# Patient Record
Sex: MALE | Race: WHITE | NOT HISPANIC OR LATINO | Employment: FULL TIME | URBAN - METROPOLITAN AREA
[De-identification: names, ages, dates, MRNs, and addresses within clinical notes are randomized per-mention and may not be internally consistent; named-entity substitution may affect disease eponyms.]

---

## 2021-05-17 ENCOUNTER — OFFICE VISIT (OUTPATIENT)
Dept: FAMILY MEDICINE CLINIC | Facility: CLINIC | Age: 56
End: 2021-05-17

## 2021-05-17 VITALS
DIASTOLIC BLOOD PRESSURE: 90 MMHG | TEMPERATURE: 97.5 F | HEIGHT: 67 IN | BODY MASS INDEX: 28.09 KG/M2 | WEIGHT: 179 LBS | OXYGEN SATURATION: 96 % | HEART RATE: 71 BPM | SYSTOLIC BLOOD PRESSURE: 124 MMHG

## 2021-05-17 DIAGNOSIS — N40.0 ENLARGED PROSTATE: Primary | ICD-10-CM

## 2021-05-17 DIAGNOSIS — D18.03 LIVER HEMANGIOMA: ICD-10-CM

## 2021-05-17 DIAGNOSIS — Z12.5 SCREENING PSA (PROSTATE SPECIFIC ANTIGEN): ICD-10-CM

## 2021-05-17 DIAGNOSIS — R31.21 ASYMPTOMATIC MICROSCOPIC HEMATURIA: ICD-10-CM

## 2021-05-17 DIAGNOSIS — Z00.00 WELLNESS EXAMINATION: ICD-10-CM

## 2021-05-17 DIAGNOSIS — R93.5 ABNORMAL CT OF THE ABDOMEN: ICD-10-CM

## 2021-05-17 PROBLEM — Z12.11 SCREEN FOR COLON CANCER: Status: ACTIVE | Noted: 2019-10-31

## 2021-05-17 PROBLEM — K52.9 CHRONIC DIARRHEA: Status: ACTIVE | Noted: 2019-10-29

## 2021-05-17 PROBLEM — R97.20 ELEVATED PSA, LESS THAN 10 NG/ML: Status: ACTIVE | Noted: 2019-10-31

## 2021-05-17 LAB
BILIRUB BLD-MCNC: NEGATIVE MG/DL
CLARITY, POC: CLEAR
COLOR UR: YELLOW
GLUCOSE UR STRIP-MCNC: NEGATIVE MG/DL
KETONES UR QL: NEGATIVE
LEUKOCYTE EST, POC: NEGATIVE
NITRITE UR-MCNC: NEGATIVE MG/ML
PH UR: 6 [PH] (ref 5–8)
PROT UR STRIP-MCNC: NEGATIVE MG/DL
RBC # UR STRIP: ABNORMAL /UL
SP GR UR: 1.01 (ref 1–1.03)
UROBILINOGEN UR QL: NORMAL

## 2021-05-17 PROCEDURE — 99203 OFFICE O/P NEW LOW 30 MIN: CPT | Performed by: FAMILY MEDICINE

## 2021-05-17 PROCEDURE — 81003 URINALYSIS AUTO W/O SCOPE: CPT | Performed by: FAMILY MEDICINE

## 2021-05-17 RX ORDER — TAMSULOSIN HYDROCHLORIDE 0.4 MG/1
CAPSULE ORAL
COMMUNITY
Start: 2021-04-29 | End: 2021-06-18

## 2021-05-17 NOTE — PROGRESS NOTES
Subjective   Jhonatan Stewart is a 55 y.o. male.     Chief Complaint   Patient presents with   • Establish Care   • Follow-up     hospital        History of Present Illness     Patient was admitted to [facility]  ALL records were obtained and reviewed and /or discussed with admitting physician  Date of admission 5/10/21  Date of discharge 5/10/21  Diagnosis Retained urine  Medications upon discharge flomax   Disposition fine  Follow up today  Currently c/o  With catheter   Condition stable  Problems urinating and had a Catheter seen by Urologist [facility], Colton Kennedy. Has Enlarged Prostate, and abnormal CT of the abdomen, recommended bone scan, patient does not smoke, there is no family history of prostate cancer,, patient just received his first Covid vaccination  The following portions of the patient's history were reviewed and updated as appropriate: allergies, current medications, past family history, past medical history, past social history, past surgical history and problem list.    History reviewed. No pertinent past medical history.    History reviewed. No pertinent surgical history.    Family History   Problem Relation Age of Onset   • Stroke Mother    • Cancer Mother    • Hyperlipidemia Mother    • Diabetes Father    • Heart disease Father    • Hyperlipidemia Father        Social History     Socioeconomic History   • Marital status: Other     Spouse name: Not on file   • Number of children: Not on file   • Years of education: Not on file   • Highest education level: Not on file   Tobacco Use   • Smoking status: Never Smoker   • Smokeless tobacco: Never Used   Substance and Sexual Activity   • Alcohol use: Yes     Comment: socially    • Drug use: Never   • Sexual activity: Defer       Review of Systems   Constitutional: Negative.    HENT: Negative.    Eyes: Negative.    Respiratory: Negative.    Cardiovascular: Negative.    Gastrointestinal: Negative.    Endocrine: Negative.    Genitourinary: Negative.     Musculoskeletal: Negative.    Skin: Negative.    Allergic/Immunologic: Negative.    Neurological: Negative.    Hematological: Negative.    Psychiatric/Behavioral: Negative.    All other systems reviewed and are negative.      Objective   Vitals:    05/17/21 1027   BP: 124/90   Pulse: 71   Temp: 97.5 °F (36.4 °C)   SpO2: 96%     Body mass index is 28.04 kg/m².  Physical Exam  Vitals and nursing note reviewed.   Constitutional:       Appearance: He is well-developed.   HENT:      Head: Normocephalic and atraumatic.      Right Ear: External ear normal.      Left Ear: External ear normal.      Nose: Nose normal.   Eyes:      General: No scleral icterus.        Right eye: No discharge.         Left eye: No discharge.      Conjunctiva/sclera: Conjunctivae normal.      Pupils: Pupils are equal, round, and reactive to light.   Neck:      Thyroid: No thyromegaly.      Vascular: No JVD.      Trachea: No tracheal deviation.   Cardiovascular:      Rate and Rhythm: Normal rate and regular rhythm.      Heart sounds: Normal heart sounds. No murmur heard.   No friction rub. No gallop.    Pulmonary:      Effort: Pulmonary effort is normal. No respiratory distress.      Breath sounds: Normal breath sounds. No wheezing or rales.   Chest:      Chest wall: No tenderness.   Abdominal:      General: Bowel sounds are normal. There is no distension.      Palpations: Abdomen is soft. There is no mass.      Tenderness: There is no abdominal tenderness. There is no guarding or rebound.      Hernia: No hernia is present.   Genitourinary:     Comments: He does have a catheter  Musculoskeletal:         General: No tenderness. Normal range of motion.      Cervical back: Normal range of motion and neck supple.   Lymphadenopathy:      Cervical: No cervical adenopathy.   Skin:     General: Skin is warm and dry.   Neurological:      Cranial Nerves: No cranial nerve deficit.      Sensory: No sensory deficit.      Motor: No abnormal muscle tone.       Coordination: Coordination normal.      Deep Tendon Reflexes: Reflexes normal.   Psychiatric:         Behavior: Behavior normal.         Thought Content: Thought content normal.         Judgment: Judgment normal.           Assessment/Plan   Diagnoses and all orders for this visit:    1. Enlarged prostate (Primary)  -     PSA Screen    2. Wellness examination  -     Cancel: CBC & Differential  -     Comprehensive Metabolic Panel  -     Lipid Panel  -     TSH  -     POC Urinalysis Dipstick, Automated    3. Screening PSA (prostate specific antigen)  -     PSA Screen    4. Liver hemangioma  -     Ambulatory Referral to Gastroenterology    5. Abnormal CT of the abdomen  -     NM Bone Scan Whole Body; Future

## 2021-05-18 DIAGNOSIS — R79.89 ELEVATED TSH: Primary | ICD-10-CM

## 2021-05-18 LAB
ALBUMIN SERPL-MCNC: 4.7 G/DL (ref 3.5–5.2)
ALBUMIN/GLOB SERPL: 2.4 G/DL
ALP SERPL-CCNC: 86 U/L (ref 39–117)
ALT SERPL-CCNC: 28 U/L (ref 1–41)
AST SERPL-CCNC: 25 U/L (ref 1–40)
BILIRUB SERPL-MCNC: 0.7 MG/DL (ref 0–1.2)
BUN SERPL-MCNC: 13 MG/DL (ref 6–20)
BUN/CREAT SERPL: 14 (ref 7–25)
CALCIUM SERPL-MCNC: 10 MG/DL (ref 8.6–10.5)
CHLORIDE SERPL-SCNC: 104 MMOL/L (ref 98–107)
CHOLEST SERPL-MCNC: 148 MG/DL (ref 0–200)
CO2 SERPL-SCNC: 28.8 MMOL/L (ref 22–29)
CREAT SERPL-MCNC: 0.93 MG/DL (ref 0.76–1.27)
GLOBULIN SER CALC-MCNC: 2 GM/DL
GLUCOSE SERPL-MCNC: 96 MG/DL (ref 65–99)
HDLC SERPL-MCNC: 61 MG/DL (ref 40–60)
LDLC SERPL CALC-MCNC: 75 MG/DL (ref 0–100)
POTASSIUM SERPL-SCNC: 4.4 MMOL/L (ref 3.5–5.2)
PROT SERPL-MCNC: 6.7 G/DL (ref 6–8.5)
PSA SERPL-MCNC: 6.85 NG/ML (ref 0–4)
SODIUM SERPL-SCNC: 140 MMOL/L (ref 136–145)
TRIGL SERPL-MCNC: 55 MG/DL (ref 0–150)
TSH SERPL DL<=0.005 MIU/L-ACNC: 4.21 UIU/ML (ref 0.27–4.2)
VLDLC SERPL CALC-MCNC: 12 MG/DL (ref 5–40)

## 2021-05-20 ENCOUNTER — TELEPHONE (OUTPATIENT)
Dept: FAMILY MEDICINE CLINIC | Facility: CLINIC | Age: 56
End: 2021-05-20

## 2021-05-20 DIAGNOSIS — Z12.5 SCREENING PSA (PROSTATE SPECIFIC ANTIGEN): ICD-10-CM

## 2021-05-20 DIAGNOSIS — R31.21 ASYMPTOMATIC MICROSCOPIC HEMATURIA: ICD-10-CM

## 2021-05-20 DIAGNOSIS — N40.0 ENLARGED PROSTATE: Primary | ICD-10-CM

## 2021-05-20 DIAGNOSIS — R97.20 ELEVATED PSA: ICD-10-CM

## 2021-05-20 DIAGNOSIS — R31.21 ASYMPTOMATIC MICROSCOPIC HEMATURIA: Primary | ICD-10-CM

## 2021-05-20 NOTE — TELEPHONE ENCOUNTER
I contacted patient and his girlfriend, they want a different urologist because the urologist there seen if no return or call, I will put a new referral

## 2021-05-20 NOTE — TELEPHONE ENCOUNTER
PATIENTS WIFE WANTS TO KNOW IF PATIENT NEEDS TO BE ON ANTIBIOTIC BECAUSE OF THE BLOOD THAT WAS IN HIS URINE.    PLEASE RECOMMEND A UROLOGIST.    [id number] - [place], [place] - [address] [facility] [address] AT [facility] - [phone] [facility] [phone]   [phone]    PLEASE ADVISE: [phone]

## 2021-05-21 ENCOUNTER — TELEPHONE (OUTPATIENT)
Dept: GASTROENTEROLOGY | Facility: CLINIC | Age: 56
End: 2021-05-21

## 2021-05-21 ENCOUNTER — TELEPHONE (OUTPATIENT)
Dept: FAMILY MEDICINE CLINIC | Facility: CLINIC | Age: 56
End: 2021-05-21

## 2021-05-21 LAB
BACTERIA UR CULT: ABNORMAL
OTHER ANTIBIOTIC SUSC ISLT: ABNORMAL

## 2021-05-21 NOTE — TELEPHONE ENCOUNTER
Caller: ROBER MARTIN    Relationship to patient: Emergency Contact    Best call back number: [phone]    Patient is needing:     PATIENT'S WIFE CALLED INFORMING DR RANGEL THAT PATIENT IS SCHEDULED FOR HIS TURP SURGERY, ON HIS PROSTATE, WITH DR BARRERA FOR THIS Monday 05/24/21.

## 2021-05-22 DIAGNOSIS — N39.0 URINARY TRACT INFECTION ASSOCIATED WITH INDWELLING URETHRAL CATHETER, SEQUELA: Primary | ICD-10-CM

## 2021-05-22 DIAGNOSIS — T83.511S URINARY TRACT INFECTION ASSOCIATED WITH INDWELLING URETHRAL CATHETER, SEQUELA: Primary | ICD-10-CM

## 2021-05-22 RX ORDER — SULFAMETHOXAZOLE AND TRIMETHOPRIM 800; 160 MG/1; MG/1
1 TABLET ORAL 2 TIMES DAILY
Qty: 14 TABLET | Refills: 0 | Status: SHIPPED | OUTPATIENT
Start: 2021-05-22 | End: 2021-06-18

## 2021-05-28 ENCOUNTER — HOSPITAL ENCOUNTER (OUTPATIENT)
Dept: NUCLEAR MEDICINE | Facility: HOSPITAL | Age: 56
Discharge: HOME | End: 2021-05-28

## 2021-05-28 DIAGNOSIS — R93.5 ABNORMAL CT OF THE ABDOMEN: ICD-10-CM

## 2021-05-28 PROCEDURE — A9503 TC99M MEDRONATE: HCPCS | Performed by: FAMILY MEDICINE

## 2021-05-28 PROCEDURE — 78306 BONE IMAGING WHOLE BODY: CPT

## 2021-05-28 PROCEDURE — 0 TECHNETIUM MEDRONATE KIT: Performed by: FAMILY MEDICINE

## 2021-05-28 RX ORDER — TC 99M MEDRONATE 20 MG/10ML
19 INJECTION, POWDER, LYOPHILIZED, FOR SOLUTION INTRAVENOUS
Status: COMPLETED | OUTPATIENT
Start: 2021-05-28 | End: 2021-05-28

## 2021-05-28 RX ADMIN — Medication 19 MILLICURIE: at 11:20

## 2021-06-03 ENCOUNTER — TELEPHONE (OUTPATIENT)
Dept: FAMILY MEDICINE CLINIC | Facility: CLINIC | Age: 56
End: 2021-06-03

## 2021-06-03 NOTE — TELEPHONE ENCOUNTER
----- Message from Ronal Chairez MD sent at 5/31/2021 10:37 AM EDT -----  Please call the patient regarding his abnormal result. Bone scan only shows mild intake Left hip due to arthritis, otherwise normal

## 2021-06-18 ENCOUNTER — OFFICE VISIT (OUTPATIENT)
Dept: FAMILY MEDICINE CLINIC | Facility: CLINIC | Age: 56
End: 2021-06-18

## 2021-06-18 VITALS
HEART RATE: 60 BPM | WEIGHT: 178.8 LBS | BODY MASS INDEX: 28.06 KG/M2 | TEMPERATURE: 97.3 F | HEIGHT: 67 IN | OXYGEN SATURATION: 95 % | SYSTOLIC BLOOD PRESSURE: 130 MMHG | DIASTOLIC BLOOD PRESSURE: 87 MMHG

## 2021-06-18 DIAGNOSIS — R82.90 URINE ABNORMALITY: ICD-10-CM

## 2021-06-18 DIAGNOSIS — Z00.00 WELLNESS EXAMINATION: ICD-10-CM

## 2021-06-18 DIAGNOSIS — R79.89 ELEVATED TSH: Primary | ICD-10-CM

## 2021-06-18 PROBLEM — N13.8 BPH WITH URINARY OBSTRUCTION: Status: ACTIVE | Noted: 2021-05-24

## 2021-06-18 PROBLEM — N40.1 BPH WITH URINARY OBSTRUCTION: Status: ACTIVE | Noted: 2021-05-24

## 2021-06-18 LAB
BILIRUB BLD-MCNC: NEGATIVE MG/DL
CLARITY, POC: CLEAR
COLOR UR: YELLOW
GLUCOSE UR STRIP-MCNC: NEGATIVE MG/DL
KETONES UR QL: NEGATIVE
LEUKOCYTE EST, POC: ABNORMAL
NITRITE UR-MCNC: NEGATIVE MG/ML
PH UR: 6 [PH] (ref 5–8)
PROT UR STRIP-MCNC: NEGATIVE MG/DL
RBC # UR STRIP: ABNORMAL /UL
SP GR UR: 1.01 (ref 1–1.03)
UROBILINOGEN UR QL: NORMAL

## 2021-06-18 PROCEDURE — 81003 URINALYSIS AUTO W/O SCOPE: CPT | Performed by: FAMILY MEDICINE

## 2021-06-18 PROCEDURE — 99213 OFFICE O/P EST LOW 20 MIN: CPT | Performed by: FAMILY MEDICINE

## 2021-06-18 RX ORDER — BACITRACIN 500 UNIT/G
OINTMENT (GRAM) TOPICAL
COMMUNITY
End: 2022-07-06

## 2021-06-18 RX ORDER — CHLORAL HYDRATE 500 MG
CAPSULE ORAL
COMMUNITY

## 2021-06-18 RX ORDER — DIAPER,BRIEF,ADULT, DISPOSABLE
EACH MISCELLANEOUS
COMMUNITY

## 2021-06-18 NOTE — PROGRESS NOTES
"Chief Complaint  Follow-up    Subjective          Jhonatan Stewart presents to [facility]  History of Present Illness  Had TURP Sx all good, surgery was last week, patient is voiding good, no chest pain or shortness of breath, he already saw the urologist, today is being seen for elevated TSH, no family history of hypothyroidism, patient feels good, no dysuria, wants Tetanus Ab  Objective   Vital Signs:   /87 (BP Location: Left arm, Patient Position: Sitting)   Pulse 60   Temp 97.3 °F (36.3 °C) (Temporal)   Ht 170.2 cm (67.01\")   Wt 81.1 kg (178 lb 12.8 oz)   SpO2 95%   BMI 28.00 kg/m²     Physical Exam  Vitals and nursing note reviewed.   Constitutional:       Appearance: He is well-developed.   HENT:      Head: Normocephalic and atraumatic.      Right Ear: External ear normal.      Left Ear: External ear normal.      Nose: Nose normal.   Eyes:      General: No scleral icterus.        Right eye: No discharge.         Left eye: No discharge.      Conjunctiva/sclera: Conjunctivae normal.      Pupils: Pupils are equal, round, and reactive to light.   Neck:      Thyroid: No thyromegaly.      Vascular: No JVD.      Trachea: No tracheal deviation.   Cardiovascular:      Rate and Rhythm: Normal rate and regular rhythm.      Heart sounds: Normal heart sounds. No murmur heard.   No friction rub. No gallop.    Pulmonary:      Effort: Pulmonary effort is normal. No respiratory distress.      Breath sounds: Normal breath sounds. No wheezing or rales.   Chest:      Chest wall: No tenderness.   Abdominal:      General: Bowel sounds are normal. There is no distension.      Palpations: Abdomen is soft. There is no mass.      Tenderness: There is no abdominal tenderness. There is no guarding or rebound.      Hernia: No hernia is present.   Musculoskeletal:         General: No tenderness. Normal range of motion.      Cervical back: Normal range of motion and neck supple. No rigidity or " tenderness.   Lymphadenopathy:      Cervical: No cervical adenopathy.   Skin:     General: Skin is warm and dry.   Neurological:      General: No focal deficit present.      Mental Status: He is alert.      Cranial Nerves: No cranial nerve deficit.      Sensory: No sensory deficit.      Motor: No abnormal muscle tone.      Coordination: Coordination normal.      Deep Tendon Reflexes: Reflexes normal.   Psychiatric:         Mood and Affect: Mood normal.         Behavior: Behavior normal.         Thought Content: Thought content normal.         Judgment: Judgment normal.        Result Review :                 Assessment and Plan    Diagnoses and all orders for this visit:    1. Elevated TSH (Primary)  -     Cancel: TSH  -     Cancel: T3, Free  -     Cancel: T4, Free  -     Tetanus Toxoid, IgG  -     TSH  -     T3, Free  -     T4, Free    2. Urine abnormality  -     POC Urinalysis Dipstick, Automated  -     Urine Culture - Urine, Urine, Clean Catch    3. Wellness examination  -     Tetanus Toxoid, IgG  -     TSH  -     T3, Free  -     T4, Free        Follow Up   Return in about 1 year (around 6/18/2022).  Patient was given instructions and counseling regarding his condition or for health maintenance advice. Please see specific information pulled into the AVS if appropriate.

## 2021-06-20 LAB
BACTERIA UR CULT: NO GROWTH
BACTERIA UR CULT: NORMAL

## 2021-06-22 LAB
C TETANI TOXOID IGG SERPL IA-ACNC: 1.61 IU/ML
T3FREE SERPL-MCNC: 3.4 PG/ML (ref 2–4.4)
T4 FREE SERPL-MCNC: 1.14 NG/DL (ref 0.93–1.7)
TSH SERPL DL<=0.005 MIU/L-ACNC: 3.13 UIU/ML (ref 0.27–4.2)

## 2021-06-30 ENCOUNTER — OFFICE VISIT (OUTPATIENT)
Dept: GASTROENTEROLOGY | Facility: CLINIC | Age: 56
End: 2021-06-30

## 2021-06-30 VITALS — TEMPERATURE: 97.5 F | WEIGHT: 179.6 LBS | BODY MASS INDEX: 28.19 KG/M2 | HEIGHT: 67 IN

## 2021-06-30 DIAGNOSIS — R93.3 ABNORMAL CT SCAN, ESOPHAGUS: Primary | ICD-10-CM

## 2021-06-30 DIAGNOSIS — K44.9 HIATAL HERNIA: ICD-10-CM

## 2021-06-30 DIAGNOSIS — D18.03 HEPATIC HEMANGIOMA: ICD-10-CM

## 2021-06-30 PROCEDURE — 99204 OFFICE O/P NEW MOD 45 MIN: CPT | Performed by: INTERNAL MEDICINE

## 2021-06-30 NOTE — PROGRESS NOTES
Chief Complaint   Patient presents with   • liver hemangioma     Subjective   HPI  Jhonatan Stewart is a 55 y.o. male who presents today for new patient evaluation.  Referred for evaluation of abnormal CT scan of the abdomen and pelvis.  This was performed due to some prostate issues.  There was incidental note of a low density lesion in the right hepatic lobe suggestive of hepatic hemangioma.  Lesion  measured about 2 cm in diameter.  There is also again probably incidental note of a small hiatal hernia with some potential mild thickening of the distal esophagus.  The patient has no gastrointestinal complaints.  He has no dysphagia or heartburn.  He is never had prior upper endoscopy.  He did have a normal colonoscopy 2 years ago.  He has no family history of liver disorders.      IMPRESSION:   1. Enlarged prostate gland with heterogeneous appearance. Please correlate clinically including PSA. Tovar catheter in place within the urinary bladder.   2. Low-density lesion right hepatic lobe with probable and. Characteristic of hepatic hemangioma.   3. Scattered sclerotic foci at the bony pelvis and proximal femurs nonspecific. If no other prior images are available for comparison. Follow-up bone scan is recommended.      Objective   Vitals:    06/30/21 0845   Temp: 97.5 °F (36.4 °C)     Physical Exam  Vitals reviewed.   Constitutional:       Appearance: He is well-developed.   HENT:      Head: Normocephalic and atraumatic.   Abdominal:      General: Bowel sounds are normal. There is no distension.      Palpations: Abdomen is soft. There is no mass.      Tenderness: There is no abdominal tenderness.      Hernia: No hernia is present.   Skin:     General: Skin is warm and dry.   Neurological:      Mental Status: He is alert and oriented to person, place, and time.   Psychiatric:         Behavior: Behavior normal.         Thought Content: Thought content normal.         Judgment: Judgment normal.              Assessment/Plan    Assessment:     1. Abnormal CT scan, esophagus    2. Hiatal hernia    3. Hepatic hemangioma      Plan:   Will arrange for EGD to evaluate the possible distal esophageal thickening seen on CT but this would seem to be an incidental finding as the patient is completely asymptomatic.  He is up-to-date on colonoscopy.  His CT scan also suggest a benign hepatic hemangioma I will arrange for him to have an MRI with and without contrast for further characterization if we are convinced this is truly a hemangioma then no further work-up would be necessary from this regard            Richard Mariano M.D.  [facility]  [address] [place] [address]  Office: [phone]

## 2021-07-15 ENCOUNTER — TELEPHONE (OUTPATIENT)
Dept: GASTROENTEROLOGY | Facility: CLINIC | Age: 56
End: 2021-07-15

## 2021-07-15 PROBLEM — R93.3 ABNORMAL CT SCAN, ESOPHAGUS: Status: ACTIVE | Noted: 2021-07-15

## 2021-07-29 ENCOUNTER — APPOINTMENT (OUTPATIENT)
Dept: MRI IMAGING | Facility: HOSPITAL | Age: 56
End: 2021-07-29

## 2021-08-01 ENCOUNTER — HOSPITAL ENCOUNTER (OUTPATIENT)
Dept: MRI IMAGING | Facility: HOSPITAL | Age: 56
Discharge: HOME | End: 2021-08-01

## 2021-08-01 DIAGNOSIS — D18.03 HEPATIC HEMANGIOMA: ICD-10-CM

## 2021-08-12 ENCOUNTER — TRANSCRIBE ORDERS (OUTPATIENT)
Dept: SLEEP MEDICINE | Facility: HOSPITAL | Age: 56
End: 2021-08-12

## 2021-08-12 DIAGNOSIS — Z01.818 OTHER SPECIFIED PRE-OPERATIVE EXAMINATION: Primary | ICD-10-CM

## 2022-07-06 ENCOUNTER — OFFICE VISIT (OUTPATIENT)
Dept: FAMILY MEDICINE CLINIC | Facility: CLINIC | Age: 57
End: 2022-07-06

## 2022-07-06 VITALS
DIASTOLIC BLOOD PRESSURE: 77 MMHG | SYSTOLIC BLOOD PRESSURE: 122 MMHG | HEART RATE: 51 BPM | HEIGHT: 67 IN | TEMPERATURE: 97.8 F | OXYGEN SATURATION: 95 % | WEIGHT: 175.4 LBS | RESPIRATION RATE: 15 BRPM | BODY MASS INDEX: 27.53 KG/M2

## 2022-07-06 DIAGNOSIS — R29.898 ELBOW PROBLEM: ICD-10-CM

## 2022-07-06 DIAGNOSIS — R97.20 ELEVATED PSA: ICD-10-CM

## 2022-07-06 DIAGNOSIS — R79.89 ELEVATED TSH: ICD-10-CM

## 2022-07-06 DIAGNOSIS — Z12.5 SCREENING PSA (PROSTATE SPECIFIC ANTIGEN): ICD-10-CM

## 2022-07-06 DIAGNOSIS — M25.50 ARTHRALGIA, UNSPECIFIED JOINT: ICD-10-CM

## 2022-07-06 DIAGNOSIS — Z00.00 WELLNESS EXAMINATION: Primary | ICD-10-CM

## 2022-07-06 LAB
BILIRUB BLD-MCNC: NEGATIVE MG/DL
CLARITY, POC: CLEAR
COLOR UR: YELLOW
EXPIRATION DATE: ABNORMAL
GLUCOSE UR STRIP-MCNC: NEGATIVE MG/DL
KETONES UR QL: NEGATIVE
LEUKOCYTE EST, POC: NEGATIVE
Lab: ABNORMAL
NITRITE UR-MCNC: NEGATIVE MG/ML
PH UR: 6 [PH] (ref 5–8)
PROT UR STRIP-MCNC: ABNORMAL MG/DL
RBC # UR STRIP: NEGATIVE /UL
SP GR UR: 1.02 (ref 1–1.03)
UROBILINOGEN UR QL: NORMAL

## 2022-07-06 PROCEDURE — 91305 COVID-19 (PFIZER) 12+ YRS: CPT | Performed by: FAMILY MEDICINE

## 2022-07-06 PROCEDURE — 81003 URINALYSIS AUTO W/O SCOPE: CPT | Performed by: FAMILY MEDICINE

## 2022-07-06 PROCEDURE — 99396 PREV VISIT EST AGE 40-64: CPT | Performed by: FAMILY MEDICINE

## 2022-07-06 PROCEDURE — 0051A COVID-19 (PFIZER) 12+ YRS: CPT | Performed by: FAMILY MEDICINE

## 2022-07-06 NOTE — PROGRESS NOTES
"Chief Complaint  Annual Exam    Subjective        Jhonatan Stewart presents to [facility]  History of Present Illness  Fasting, non smoker, no ETOH, no drugs, seen by Urologist x PSA , no cancer, 2 bx, , wants PSA, no prostate cancer in family, no colon cancer, uncle with liver and throat cancer, MI on dad and mom with stroke, and angina, father with DM, Colonoscopy 2019, c/o joints ache and does not want pain meds, also unable to extend both elbows fully since 2009, no pain, had CTS and tingling on hands  Objective   Vital Signs:  /77 (BP Location: Left arm, Patient Position: Sitting, Cuff Size: Adult)   Pulse 51   Temp 97.8 °F (36.6 °C) (Infrared)   Resp 15   Ht 170.2 cm (67\")   Wt 79.6 kg (175 lb 6.4 oz)   SpO2 95%   BMI 27.47 kg/m²   Estimated body mass index is 27.47 kg/m² as calculated from the following:    Height as of this encounter: 170.2 cm (67\").    Weight as of this encounter: 79.6 kg (175 lb 6.4 oz).          Physical Exam  Vitals and nursing note reviewed.   Constitutional:       General: He is not in acute distress.     Appearance: Normal appearance. He is well-developed. He is not ill-appearing, toxic-appearing or diaphoretic.   HENT:      Head: Normocephalic and atraumatic.      Right Ear: Tympanic membrane, ear canal and external ear normal. There is no impacted cerumen.      Left Ear: Tympanic membrane, ear canal and external ear normal. There is no impacted cerumen.      Nose: Nose normal. No congestion or rhinorrhea.      Mouth/Throat:      Mouth: Mucous membranes are moist.      Pharynx: Oropharynx is clear. No oropharyngeal exudate or posterior oropharyngeal erythema.   Eyes:      General: No scleral icterus.        Right eye: No discharge.         Left eye: No discharge.      Extraocular Movements: Extraocular movements intact.      Conjunctiva/sclera: Conjunctivae normal.      Pupils: Pupils are equal, round, and reactive to light.   Neck:      " Thyroid: No thyromegaly.      Vascular: No carotid bruit or JVD.      Trachea: No tracheal deviation.   Cardiovascular:      Rate and Rhythm: Normal rate and regular rhythm.      Heart sounds: Normal heart sounds. No murmur heard.    No friction rub. No gallop.   Pulmonary:      Effort: Pulmonary effort is normal. No respiratory distress.      Breath sounds: Normal breath sounds. No stridor. No wheezing, rhonchi or rales.   Chest:      Chest wall: No tenderness.   Abdominal:      General: Bowel sounds are normal. There is no distension.      Palpations: Abdomen is soft. There is no mass.      Tenderness: There is no abdominal tenderness. There is no right CVA tenderness, left CVA tenderness, guarding or rebound.      Hernia: No hernia is present.   Musculoskeletal:         General: Normal range of motion.      Cervical back: Normal range of motion and neck supple. No rigidity or tenderness.      Right lower leg: No edema.      Left lower leg: No edema.      Comments: Unable to fully extend both elbows   Lymphadenopathy:      Cervical: No cervical adenopathy.   Skin:     General: Skin is warm and dry.      Coloration: Skin is not jaundiced.   Neurological:      General: No focal deficit present.      Mental Status: He is alert and oriented to person, place, and time. Mental status is at baseline.      Sensory: No sensory deficit.      Motor: No weakness or abnormal muscle tone.      Coordination: Coordination normal.      Gait: Gait normal.      Deep Tendon Reflexes: Reflexes normal.   Psychiatric:         Mood and Affect: Mood normal.         Behavior: Behavior normal.         Thought Content: Thought content normal.         Judgment: Judgment normal.        Result Review :                Assessment and Plan   Diagnoses and all orders for this visit:    1. Wellness examination (Primary)  -     COVID-19 Vaccine (Pfizer) Gray Cap  -     CBC & Differential  -     Comprehensive Metabolic Panel  -     Lipid Panel  -      TSH  -     PSA Screen  -     POC Urinalysis Dipstick, Automated    2. Elevated TSH  -     TSH    3. Elevated PSA  -     PSA Screen    4. Arthralgia, unspecified joint  -     JOSH  -     Rheumatoid Factor  -     Uric Acid  -     Sedimentation Rate    5. Screening PSA (prostate specific antigen)  -     PSA Screen    6. Elbow problem  -     Ambulatory Referral to Orthopedic Surgery             Follow Up   No follow-ups on file.  Patient was given instructions and counseling regarding his condition or for health maintenance advice. Please see specific information pulled into the AVS if appropriate.

## 2022-07-07 ENCOUNTER — TELEPHONE (OUTPATIENT)
Dept: FAMILY MEDICINE CLINIC | Facility: CLINIC | Age: 57
End: 2022-07-07

## 2022-07-07 LAB
ALBUMIN SERPL-MCNC: 4.1 G/DL (ref 3.8–4.9)
ALBUMIN/GLOB SERPL: 1.8 {RATIO} (ref 1.2–2.2)
ALP SERPL-CCNC: 69 IU/L (ref 44–121)
ALT SERPL-CCNC: 30 IU/L (ref 0–44)
ANA SER QL: NEGATIVE
AST SERPL-CCNC: 31 IU/L (ref 0–40)
BASOPHILS # BLD AUTO: 0 X10E3/UL (ref 0–0.2)
BASOPHILS NFR BLD AUTO: 1 %
BILIRUB SERPL-MCNC: 0.4 MG/DL (ref 0–1.2)
BUN SERPL-MCNC: 18 MG/DL (ref 6–24)
BUN/CREAT SERPL: 20 (ref 9–20)
CALCIUM SERPL-MCNC: 9.2 MG/DL (ref 8.7–10.2)
CHLORIDE SERPL-SCNC: 106 MMOL/L (ref 96–106)
CHOLEST SERPL-MCNC: 134 MG/DL (ref 100–199)
CO2 SERPL-SCNC: 23 MMOL/L (ref 20–29)
CREAT SERPL-MCNC: 0.89 MG/DL (ref 0.76–1.27)
EGFRCR SERPLBLD CKD-EPI 2021: 101 ML/MIN/1.73
EOSINOPHIL # BLD AUTO: 0.2 X10E3/UL (ref 0–0.4)
EOSINOPHIL NFR BLD AUTO: 3 %
ERYTHROCYTE [DISTWIDTH] IN BLOOD BY AUTOMATED COUNT: 13 % (ref 11.6–15.4)
ERYTHROCYTE [SEDIMENTATION RATE] IN BLOOD BY WESTERGREN METHOD: 3 MM/HR (ref 0–30)
GLOBULIN SER CALC-MCNC: 2.3 G/DL (ref 1.5–4.5)
GLUCOSE SERPL-MCNC: 101 MG/DL (ref 65–99)
HCT VFR BLD AUTO: 41.2 % (ref 37.5–51)
HDLC SERPL-MCNC: 49 MG/DL
HGB BLD-MCNC: 13.8 G/DL (ref 13–17.7)
IMM GRANULOCYTES # BLD AUTO: 0 X10E3/UL (ref 0–0.1)
IMM GRANULOCYTES NFR BLD AUTO: 0 %
LDLC SERPL CALC-MCNC: 71 MG/DL (ref 0–99)
LYMPHOCYTES # BLD AUTO: 1.7 X10E3/UL (ref 0.7–3.1)
LYMPHOCYTES NFR BLD AUTO: 35 %
MCH RBC QN AUTO: 28.4 PG (ref 26.6–33)
MCHC RBC AUTO-ENTMCNC: 33.5 G/DL (ref 31.5–35.7)
MCV RBC AUTO: 85 FL (ref 79–97)
MONOCYTES # BLD AUTO: 0.4 X10E3/UL (ref 0.1–0.9)
MONOCYTES NFR BLD AUTO: 9 %
NEUTROPHILS # BLD AUTO: 2.5 X10E3/UL (ref 1.4–7)
NEUTROPHILS NFR BLD AUTO: 52 %
PLATELET # BLD AUTO: 232 X10E3/UL (ref 150–450)
POTASSIUM SERPL-SCNC: 4.1 MMOL/L (ref 3.5–5.2)
PROT SERPL-MCNC: 6.4 G/DL (ref 6–8.5)
PSA SERPL-MCNC: 8.1 NG/ML (ref 0–4)
RBC # BLD AUTO: 4.86 X10E6/UL (ref 4.14–5.8)
RHEUMATOID FACT SERPL-ACNC: <10 IU/ML
SODIUM SERPL-SCNC: 141 MMOL/L (ref 134–144)
TRIGL SERPL-MCNC: 66 MG/DL (ref 0–149)
TSH SERPL DL<=0.005 MIU/L-ACNC: 2.5 UIU/ML (ref 0.45–4.5)
URATE SERPL-MCNC: 4.9 MG/DL (ref 3.8–8.4)
VLDLC SERPL CALC-MCNC: 14 MG/DL (ref 5–40)
WBC # BLD AUTO: 4.9 X10E3/UL (ref 3.4–10.8)

## 2022-07-07 NOTE — TELEPHONE ENCOUNTER
Result discussed with patient, PSA elevated, patient already seen by urologist, will contact his urologist for follow-up

## 2022-07-25 ENCOUNTER — OFFICE VISIT (OUTPATIENT)
Dept: ORTHOPEDIC SURGERY | Facility: CLINIC | Age: 57
End: 2022-07-25

## 2022-07-25 VITALS — BODY MASS INDEX: 25.74 KG/M2 | HEIGHT: 67 IN | WEIGHT: 164 LBS | TEMPERATURE: 98 F

## 2022-07-25 DIAGNOSIS — M19.029 ARTHRITIS OF ELBOW: ICD-10-CM

## 2022-07-25 DIAGNOSIS — M25.521 BILATERAL ELBOW JOINT PAIN: Primary | ICD-10-CM

## 2022-07-25 DIAGNOSIS — M25.522 BILATERAL ELBOW JOINT PAIN: Primary | ICD-10-CM

## 2022-07-25 DIAGNOSIS — M16.10 ARTHRITIS, HIP: ICD-10-CM

## 2022-07-25 PROCEDURE — 99203 OFFICE O/P NEW LOW 30 MIN: CPT | Performed by: ORTHOPAEDIC SURGERY

## 2022-07-25 PROCEDURE — 73070 X-RAY EXAM OF ELBOW: CPT | Performed by: ORTHOPAEDIC SURGERY

## 2022-07-25 NOTE — PROGRESS NOTES
"  Patient: Jhonatan Stewart    YOB: 1965    Medical Record Number: [id number]    Chief Complaints: Bilateral elbow \"bumps\"    History of Present Illness:     56 y.o. male patient who presents for evaluation of both elbows.  He has a history of arthritis in his elbows.  He had surgery to help improve his left elbow motion back in 2010.  That did help tremendously.  He says he went from only being able to extend the elbow to about 35 degrees to being able to extend to about 15 degrees.  He is a very active almaz and very much enjoys weightlifting and working out.  He tells me he has noticed bumps over the lateral side of both elbows recently.  He was understandably concerned and wanted to get these evaluated.  He reports that he still has functional motion  in both elbows.  His motion is not full but he is pretty much able to do everything that he wants to do on a daily basis.  He does not have any pain in his elbows.  Denies any mechanical symptoms including catching or locking.    Allergies: No Known Allergies    Home Medications:      Current Outpatient Medications:   •  Coenzyme Q10 ([employer] CoQ-10) 200 MG capsule, , Disp: , Rfl:   •  Omega-3 1000 MG capsule, , Disp: , Rfl:   •  Probiotic Product (PROBIOTIC-10 ULTIMATE PO), , Disp: , Rfl:   •  Turmeric 450 MG capsule, , Disp: , Rfl:     History reviewed. No pertinent past medical history.    Past Surgical History:   Procedure Laterality Date   • COLONOSCOPY  2019   • ELBOW PROCEDURE Left 2010   • KNEE SURGERY Left     1993, 1994   • PROSTATE SURGERY  2021       Social History     Occupational History   • Not on file   Tobacco Use   • Smoking status: Never Smoker   • Smokeless tobacco: Never Used   Substance and Sexual Activity   • Alcohol use: Yes     Comment: socially    • Drug use: Never   • Sexual activity: Defer      Social History     Social History Narrative   • Not on file       Family History   Problem Relation Age of Onset   • Stroke Mother    • " "Cancer Mother         female   • Hyperlipidemia Mother    • Diabetes Father    • Heart disease Father    • Hyperlipidemia Father    • Diabetes Brother    • Hypertension Brother    • Cancer Maternal Aunt         female   • Cancer Maternal Uncle         throat   • Cancer Maternal Uncle         liver       Review of Systems:      Constitutional: Denies fever, shaking or chills   Eyes: Denies change in visual acuity   HEENT: Denies nasal congestion or sore throat   Respiratory: Denies cough or shortness of breath   Cardiovascular: Denies chest pain or edema  Endocrine: Denies tremors, palpitations, intolerance of heat or cold, polyuria, polydipsia.  GI: Denies abdominal pain, nausea, vomiting, bloody stools or diarrhea  : Denies frequency, urgency, incontinence, retention, or nocturia.  Musculoskeletal: Denies numbness, tingling or loss of motor function except as above  Integument: Denies rash, lesion or ulceration   Neurologic: Denies headache or focal weakness, deficits  Heme: Denies spontaneous or excessive bleeding, epistaxis, hematuria, melena, fatigue, enlarged or tender lymph nodes.      All other pertinent positives and negatives as noted above in HPI.    Physical Exam: 56 y.o. male    Vitals:    07/25/22 1432   Temp: 98 °F (36.7 °C)   Weight: 74.4 kg (164 lb)   Height: 170.2 cm (67\")       General:  Patient is awake and alert.  Appears in no acute distress or discomfort.    Psych:  Affect and demeanor are appropriate.    Eyes:  Conjunctiva and sclera appear grossly normal.  Eyes track well and EOM seem to be intact.    Ears:  No gross abnormalities.  Hearing adequate for the exam.    Cardiovascular:  Regular rate and rhythm.    Lungs:  Good chest expansion.  Breathing unlabored.    Lymph:  No palpable masses or adenopathy in the affected extremity    Extremities: Both elbows are examined.  His exam is fairly symmetric.  Skin is benign.  He has a well-healed posterior surgical scar on the left.  He has " "prominence of the radial heads bilaterally.  He has mild tenderness over the radial heads as well.  Otherwise, no significant tenderness in his elbows.  No effusion or increased warmth.  He lacks maybe 15 degrees of terminal extension on the left and he has flexion to about 130 degrees.  On the right, he is about the same.  He cannot quite flex his elbows to get his hand to his shoulder but he can flex enough to get his hand to his face.  He can push and pull against resistance with good strength and no pain.  No elbow instability.  Intact motor and sensory function in his wrist and hands.  Palpable radial pulses.         Radiology:   Lateral AP and lateral views of the elbows are ordered and reviewed evaluate his complaint.  No comparison films are available.  He has moderate ulnohumeral and radiocapitellar osteoarthritis with osteophyte formation off of the radial head which I think accounts for the \"bumps\" that he has noticed.    Assessment/Plan: Bilateral elbow osteoarthritis    I discussed with him the natural history of this condition.  I explained that it is favorable.  I do not consider that there is any surgical intervention that I could offer him that could predictably improve his situation at this point.  He actually has pretty good motion and a scope could not really predictably improve what he has at this point.  If it worsens or he develops any mechanical symptoms, he may be a candidate for arthroscopy in the future.  I think therapy may help him to improve his motion a little bit.  At the very least, they could teach him some exercises that he could work on as a home program to help maintain his motion going forward.  We talked about use of anti-inflammatory medicines as needed.  I also told him that he would potentially be a candidate for injections in the future if his pain worsens.  For now, I will release him to follow-up with me as needed.  If his symptoms change, I told him I will be happy to " see him back and reevaluate.    David Rivera MD    07/25/2022    CC to Ronal Gutiérrez MD

## 2022-07-29 ENCOUNTER — PATIENT ROUNDING (BHMG ONLY) (OUTPATIENT)
Dept: ORTHOPEDIC SURGERY | Facility: CLINIC | Age: 57
End: 2022-07-29

## 2022-07-29 NOTE — PROGRESS NOTES
A [employer] Message has been sent to the patient for PATIENT ROUNDING with [facility]

## 2022-08-15 ENCOUNTER — OFFICE VISIT (OUTPATIENT)
Dept: ORTHOPEDIC SURGERY | Facility: CLINIC | Age: 57
End: 2022-08-15

## 2022-08-15 VITALS — BODY MASS INDEX: 26.68 KG/M2 | HEIGHT: 67 IN | WEIGHT: 170 LBS | RESPIRATION RATE: 18 BRPM | TEMPERATURE: 97.5 F

## 2022-08-15 DIAGNOSIS — R52 PAIN: ICD-10-CM

## 2022-08-15 DIAGNOSIS — M54.16 LUMBAR RADICULOPATHY: Primary | ICD-10-CM

## 2022-08-15 DIAGNOSIS — M16.12 PRIMARY OSTEOARTHRITIS OF LEFT HIP: ICD-10-CM

## 2022-08-15 PROCEDURE — 72100 X-RAY EXAM L-S SPINE 2/3 VWS: CPT | Performed by: ORTHOPAEDIC SURGERY

## 2022-08-15 PROCEDURE — 99214 OFFICE O/P EST MOD 30 MIN: CPT | Performed by: ORTHOPAEDIC SURGERY

## 2022-08-15 PROCEDURE — 73502 X-RAY EXAM HIP UNI 2-3 VIEWS: CPT | Performed by: ORTHOPAEDIC SURGERY

## 2022-08-15 NOTE — PROGRESS NOTES
General Exam    Patient: Jhonatan Stewart    YOB: 1965    Medical Record Number: [id number]    Chief Complaints: Left hip, buttock and leg pain    History of Present Illness:     56 y.o. male patient who presents for evaluation treatment of left hip buttock and leg pain.  Patient states he has had symptoms over the past few years this past year his been worse.  Does have a history of radiculopathy resulting in some numbness that required physical therapy but did resolve that was in 2007.  Reports most of his pain is posterior lateral about the hip and buttock area and it radiates down the leg to the foot reports it seems to travel down the side of the thigh and leg.  Denies any groin pain.  Also states he feels he has a decrease in some of the mobility in his hip compared to the contralateral side.  Did have some previous imaging done going bone scan which commented on arthritic changes.  Is here today to discuss treatments.  Patient denies any progressive weakness and no bowel or bladder issues.    Denies any numbness or tingling.  Denies any fevers, cough or shortness of breath.    Allergies: No Known Allergies    Home Medications:      Current Outpatient Medications:   •  Coenzyme Q10 ([employer] CoQ-10) 200 MG capsule, , Disp: , Rfl:   •  Omega-3 1000 MG capsule, , Disp: , Rfl:   •  Probiotic Product (PROBIOTIC-10 ULTIMATE PO), , Disp: , Rfl:   •  Turmeric 450 MG capsule, , Disp: , Rfl:     No past medical history on file.    Past Surgical History:   Procedure Laterality Date   • COLONOSCOPY  2019   • ELBOW PROCEDURE Left 2010   • KNEE SURGERY Left     1993, 1994   • PROSTATE SURGERY  2021       Social History     Occupational History   • Not on file   Tobacco Use   • Smoking status: Never Smoker   • Smokeless tobacco: Never Used   Vaping Use   • Vaping Use: Never used   Substance and Sexual Activity   • Alcohol use: Yes     Alcohol/week: 3.0 standard drinks     Types: 3 Cans of beer per week   • Drug use:  "Never   • Sexual activity: Defer      Social History     Social History Narrative   • Not on file       Family History   Problem Relation Age of Onset   • Stroke Mother    • Cancer Mother         female   • Hyperlipidemia Mother    • Diabetes Father    • Heart disease Father    • Hyperlipidemia Father    • Diabetes Brother    • Hypertension Brother    • Cancer Maternal Aunt         female   • Cancer Maternal Uncle         throat   • Cancer Maternal Uncle         liver       Review of Systems:      Constitutional: Denies fever, shaking or chills         All other pertinent positives and negatives as noted above in HPI.    Physical Exam: 56 y.o. male    Vitals:    08/15/22 0946   Resp: 18   Temp: 97.5 °F (36.4 °C)   Weight: 77.1 kg (170 lb)   Height: 170.2 cm (67\")       General:  Patient is awake and alert.  Appears in no acute distress or discomfort.      Musculoskeletal/Extremities:    Left lower extremity examined no tenderness to palpation about the hip.  Hip range of motion is decreased with internal/external rotation compared to the contralateral side but he feels more tight in nature is not overly painful to the patient.  Negative Stinchfield straight leg raise.  Patient can stand from a seated position ambulates with normal gait unassisted.  Motor and sensory intact distally.  Reflexes equal to the contralateral side.         Radiology:       3 views of the left hip include AP pelvis, AP and lateral taken reviewed as well as AP and lateral lumbar spine to evaluate the patient's complaint/s.    Hip imaging does show some mild to moderate degenerative changes with some bone sclerosis and possible early osteophyte formation.  There appears to be bone island within the femoral head neck region.  Apparent calcification involving the labrum superiorly and laterally.    CT and bone scan consistent with some degenerative changes as noted above.    AP and lateral lumbar spine shows some degenerative changes " particularly about L5 and S1.  Also some evidence of osteophyte formation about the vertebral bodies anteriorly of the lumbar spine.        Assessment: Lumbar radiculopathy left and osteoarthritis left hip      Plan:      I discussed the findings with the patient.  He does have some degenerative changes within his hip joint however I do not think this is the driving cause of his symptoms at this time.  Does have degenerative changes about the lumbar spine as noted above particularly L5-S1 which is consistent with current symptoms especially in that nerve root distribution of L5.  Recommend some formal physical therapy for lumbar radiculopathy and hip arthritis.  I do think some of the arthritis may be caused a little stiffness about the hip which hopefully some therapy can help resolve as well as his radiculopathy.  Recommended continuing some anti-inflammatories over the next few weeks while doing therapy and to go ahead and set up an appointment in 1 month with our spine nurse practitioner.  Patient is any changes or worsening of symptoms during the interim he is instructed to follow-up sooner.  All questions were answered.  Patient understands and agrees with the plan.               Jordin Hawk MD    08/15/2022    CC to Ronal Gutiérrez MD

## 2022-08-17 ENCOUNTER — TREATMENT (OUTPATIENT)
Dept: PHYSICAL THERAPY | Facility: CLINIC | Age: 57
End: 2022-08-17

## 2022-08-17 DIAGNOSIS — M25.621 JOINT STIFFNESS OF ELBOW, RIGHT: ICD-10-CM

## 2022-08-17 DIAGNOSIS — M25.622 JOINT STIFFNESS OF ELBOW, LEFT: Primary | ICD-10-CM

## 2022-08-17 PROCEDURE — 97530 THERAPEUTIC ACTIVITIES: CPT | Performed by: PHYSICAL THERAPIST

## 2022-08-17 PROCEDURE — 97110 THERAPEUTIC EXERCISES: CPT | Performed by: PHYSICAL THERAPIST

## 2022-08-17 PROCEDURE — 97162 PT EVAL MOD COMPLEX 30 MIN: CPT | Performed by: PHYSICAL THERAPIST

## 2022-08-17 NOTE — PROGRESS NOTES
Physical Therapy Initial Evaluation and Plan of Care    Patient: Jhonatan Stewart   : 1965  Diagnosis/ICD-10 Code:  Joint stiffness of elbow, left [M25.622]  Referring practitioner: David Rivera MD  Past Medical History Reviewed: 2022    PLOF: Independent and likes to lift weights    Subjective Evaluation    History of Present Illness  Date of onset: 2022  Mechanism of injury: My elbows do not bother me, they just do not extend al the way. My hands do wake me up in the middle of the night. I had surgery in  on my left elbow and that helped me regain some motion. Last year I had surgery on my prostate and I woke up with left hip pain.  I do not do any stretching. I lift weights but not heavy.   I have a static splint at home, but I have not used it  I would like to try and get motion back in my elbows.   No pain in shoulders, no neck or back pain.   No numbness or tingling in the elbow        Patient Occupation: . do some lifting Pain  Current pain ratin  At worst pain ratin  Location: (B) elbows  Exacerbated by: pointing.  Progression: worsening    Hand dominance: right    Diagnostic Tests  X-ray: normal             Objective          Observations     Additional Elbow Observation Details  Visible hypertrophy of left bicep>right bicep    Palpation     Additional Palpation Details  No TTP, but elbow and wrist musculature tight    Neurological Testing     Sensation     Elbow   Left Elbow   Intact: light touch    Right Elbow   Intact: light touch    Active Range of Motion     Left Elbow   Flexion: 130 degrees   Extension: 36 degrees   Forearm supination: 70 degrees   Forearm pronation: 70 degrees     Right Elbow   Flexion: 130 degrees   Extension: 30 degrees   Forearm supination: 70 degrees   Forearm pronation: 70 degrees     Strength/Myotome Testing     Left Elbow   Normal strength    Right Elbow   Normal strength          Assessment & Plan     Assessment  Impairments:  abnormal muscle tone, abnormal or restricted ROM and lacks appropriate home exercise program    Assessment details: Pt presents to PT with chronic (B) elbow stiffness resulting in decreased ability for him to perform ADL's with ease.  Pt would benefit from joint mobilization, muscle and joint stretching as well as possible dynamic splint to address these deficits  Prognosis: fair  Prognosis details: Chronic elbow stiffness for many years and has multiple stiff joints    Goals  Plan Goals: SHORT TERM GOALS: 2-3 weeks  1. Pt will be compliant with HEP  2. Pt will have 80 degrees or greater of supination and pronation of (B) elbows  3. Pt will improve elbow ROM to  of (B) elbows    LONG TERM GOALS: 4-6 weeks  1. Pt will score 2% or less on QUickDASH  2. Pt will be able to extend (B) elbows to 25 degrees or greater.   3. Pt will report improved ability to reach behind his head and ambulate without noted elbow limitations    Plan  Therapy options: will be seen for skilled therapy services  Planned modality interventions: cryotherapy, dry needling, TENS, ultrasound and thermotherapy (hydrocollator packs)  Other planned modality interventions: cupping  Planned therapy interventions: body mechanics training, ADL retraining, flexibility, fine motor coordination training, functional ROM exercises, gait training, home exercise program, joint mobilization, manual therapy, neuromuscular re-education, postural training, orthotic fitting/training, soft tissue mobilization, spinal/joint mobilization, strengthening, stretching and therapeutic activities  Frequency: 2x week  Duration in weeks: 8  Treatment plan discussed with: patient        Manual Therapy:    -     mins  83051;  Therapeutic Exercise:    10     mins  27133;     Neuromuscular Juancho:    -    mins  96655;    Therapeutic Activity:     10     mins  18755;     Gait Training:      -     mins  96158;     Ultrasound:     -     mins  13320;    Electrical Stimulation:    -      mins  14713 ( );  Dry Needling     -     mins self-pay    Timed Treatment:   20   mins   Total Treatment:     45   mins      PT SIGNATURE: Dagmar Luu, SHIRA   [place] license #: [id number]  DATE TREATMENT INITIATED: 8/17/2022    Initial Certification  Certification Period: 11/15/2022  I certify that the therapy services are furnished while this patient is under my care.  The services outlined above are required by this patient, and will be reviewed every 90 days.     PHYSICIAN: David Rivera MD      DATE:     Please sign and return via fax to [phone].. Thank you, [facility].

## 2022-08-19 ENCOUNTER — TREATMENT (OUTPATIENT)
Dept: PHYSICAL THERAPY | Facility: CLINIC | Age: 57
End: 2022-08-19

## 2022-08-19 DIAGNOSIS — M25.622 JOINT STIFFNESS OF ELBOW, LEFT: Primary | ICD-10-CM

## 2022-08-19 DIAGNOSIS — M25.621 JOINT STIFFNESS OF ELBOW, RIGHT: ICD-10-CM

## 2022-08-19 PROCEDURE — 97110 THERAPEUTIC EXERCISES: CPT | Performed by: PHYSICAL THERAPIST

## 2022-08-19 PROCEDURE — 97140 MANUAL THERAPY 1/> REGIONS: CPT | Performed by: PHYSICAL THERAPIST

## 2022-08-19 NOTE — PROGRESS NOTES
Physical Therapy Daily Treatment Note  Visit # 2        Patient: Jhonatan Stewart   : 1965  Referring practitioner: David Rivera MD  Date of Initial Evaluation:  Type: THERAPY  Noted: 2022  Today's Date: 2022           ICD-10-CM ICD-9-CM   1. Joint stiffness of elbow, left  M25.622 719.52   2. Joint stiffness of elbow, right  M25.621 719.52       Subjective  Jhonatan Stewart reports:   No significant changes from status at PT eval through today.    Objective   See Exercise, Manual, and Modality Logs for complete treatment.   Trial IASTM to (L) UE and myofascial decompression to (R) UE to address potential soft tissue limitations.   Reviewed current HEP, discussed various ways of stretching.  Issued standing wrist flexor stretch/elbow ext stretch w/palms on table, discussed and practiced LLLD stretch with 3-5# dumbbell in elbow extended position over wedge x3 min ea side.     Assessment/Plan  Tolerated continued manual therapy and progression of therapeutic exercise well today, no pain noted throughout session.  Pt experienced with a variety of stretching, up to using full body weight for hanging stretch on chin-up bar, unsure how effective standard stretching will prove to be.       Progress per Plan of Care and Progress strengthening /stabilization /functional activity, assess response to IASTM, myofascial decompression; continue as indicated.            Timed:         Manual Therapy:     30    mins  50969     Therapeutic Exercise:     12    mins  69641     Neuromuscular Juancho:        mins  98307    Therapeutic Activity:          mins  90359     Gait Training:           mins  83647     Ultrasound:          mins  66438    Ionto                                   mins  49506  Self Care                            mins  56376    Un-Timed:  Electrical Stimulation:         mins 12687 ( )  Traction          mins 95609    Timed Treatment:   42   mins   Total Treatment:     48   mins    Dom  Son [facility] License #[id number]  Physical Therapist Assistant

## 2022-08-23 ENCOUNTER — TREATMENT (OUTPATIENT)
Dept: PHYSICAL THERAPY | Facility: CLINIC | Age: 57
End: 2022-08-23

## 2022-08-23 DIAGNOSIS — M25.621 JOINT STIFFNESS OF ELBOW, RIGHT: ICD-10-CM

## 2022-08-23 DIAGNOSIS — M25.622 JOINT STIFFNESS OF ELBOW, LEFT: Primary | ICD-10-CM

## 2022-08-23 PROCEDURE — 97110 THERAPEUTIC EXERCISES: CPT | Performed by: PHYSICAL THERAPIST

## 2022-08-23 PROCEDURE — 97140 MANUAL THERAPY 1/> REGIONS: CPT | Performed by: PHYSICAL THERAPIST

## 2022-08-23 NOTE — PROGRESS NOTES
Physical Therapy Daily Treatment Note  Visit # 3        Patient: Jhonatan Stewart   : 1965  Referring practitioner: David Rivera MD  Date of Initial Evaluation:  Type: THERAPY  Noted: 2022  Today's Date: 2022           ICD-10-CM ICD-9-CM   1. Joint stiffness of elbow, left  M25.622 719.52   2. Joint stiffness of elbow, right  M25.621 719.52       Subjective  Jhonatan Stewart reports:   No significant changes from lat visit through today.    Objective   See Exercise, Manual, and Modality Logs for complete treatment.   Cont'd IASTM and myofascial decompression to (B) UE to address potential soft tissue limitations.     Assessment/Plan  Tolerated continued manual therapy well, no significant or lasting pain noted throughout session.    Pt experienced with a variety of stretching, up to using full body weight for hanging stretch on chin-up bar, unsure how effective standard stretching will prove to be.       Progress per Plan of Care and Progress strengthening /stabilization /functional activity, assess response to IASTM, myofascial decompression; continue as indicated.            Timed:         Manual Therapy:     35    mins  47258     Therapeutic Exercise:     9    mins  96647     Neuromuscular Juancho:        mins  44479    Therapeutic Activity:          mins  81595     Gait Training:           mins  34160     Ultrasound:          mins  21002    Ionto                                   mins  93512  Self Care                            mins  22830    Un-Timed:  Electrical Stimulation:         mins 22283 ( )  Traction          mins 56048    Timed Treatment:   44   mins   Total Treatment:     55   mins    LORE Sierra License #[id number]  Physical Therapist Assistant

## 2022-08-25 ENCOUNTER — TREATMENT (OUTPATIENT)
Dept: PHYSICAL THERAPY | Facility: CLINIC | Age: 57
End: 2022-08-25

## 2022-08-25 DIAGNOSIS — M25.622 JOINT STIFFNESS OF ELBOW, LEFT: Primary | ICD-10-CM

## 2022-08-25 DIAGNOSIS — M25.621 JOINT STIFFNESS OF ELBOW, RIGHT: ICD-10-CM

## 2022-08-25 PROCEDURE — 97140 MANUAL THERAPY 1/> REGIONS: CPT | Performed by: PHYSICAL THERAPIST

## 2022-08-25 NOTE — PROGRESS NOTES
Physical Therapy Daily Treatment Note  Visit # 4        Patient: Jhonatan Stewart   : 1965  Referring practitioner: David Rivera MD  Date of Initial Evaluation:  Type: THERAPY  Noted: 2022  Today's Date: 2022           ICD-10-CM ICD-9-CM   1. Joint stiffness of elbow, left  M25.622 719.52   2. Joint stiffness of elbow, right  M25.621 719.52       Subjective  Jhonatan Stewart reports:   No significant changes from lat visit through today.  Does note that he spoke with his mother and looked at some old pictures of his father, and that tightness in the elbow seemed to be present in the pictures and from what his mother could recall.  Also noticed apparent elbow tightness in younger pictures of himself.     Objective   See Exercise, Manual, and Modality Logs for complete treatment.   Cont'd IASTM and myofascial decompression to (B) UE to address potential soft tissue limitations.     Assessment/Plan  Tolerated continued manual therapy well, no significant or lasting pain noted throughout session.    Pt experienced with a variety of stretching, up to using full body weight for hanging stretch on chin-up bar, unsure how effective standard stretching will prove to be.       Progress per Plan of Care and Progress strengthening /stabilization /functional activity, continue as rl.            Timed:         Manual Therapy:     35    mins  47821     Therapeutic Exercise:         mins  51542     Neuromuscular Juancho:        mins  42292    Therapeutic Activity:          mins  60432     Gait Training:           mins  58433     Ultrasound:          mins  21734    Ionto                                   mins  91215  Self Care                            mins  04128    Un-Timed:  Electrical Stimulation:         mins 99348 ( )  Traction          mins 21538    Timed Treatment:   35   mins   Total Treatment:     45   mins    LORE Sierra License #[id number]  Physical Therapist Assistant

## 2022-09-02 ENCOUNTER — TREATMENT (OUTPATIENT)
Dept: PHYSICAL THERAPY | Facility: CLINIC | Age: 57
End: 2022-09-02

## 2022-09-02 DIAGNOSIS — M25.622 JOINT STIFFNESS OF ELBOW, LEFT: Primary | ICD-10-CM

## 2022-09-02 DIAGNOSIS — M25.621 JOINT STIFFNESS OF ELBOW, RIGHT: ICD-10-CM

## 2022-09-02 PROCEDURE — 97140 MANUAL THERAPY 1/> REGIONS: CPT | Performed by: PHYSICAL THERAPIST

## 2022-09-02 NOTE — PROGRESS NOTES
Physical Therapy Daily Treatment Note  Visit # 5        Patient: Jhonaatn Stewart   : 1965  Referring practitioner: David Rivera MD  Date of Initial Evaluation:  Type: THERAPY  Noted: 2022  Today's Date: 2022           ICD-10-CM ICD-9-CM   1. Joint stiffness of elbow, left  M25.622 719.52   2. Joint stiffness of elbow, right  M25.621 719.52       Subjective  Jhonatan Stewart reports:   No significant changes from last visit through today.    Objective   See Exercise, Manual, and Modality Logs for complete treatment.   Cont'd IASTM and myofascial decompression to (B) UE to address potential soft tissue limitations.     Assessment/Plan  Tolerated continued manual therapy well, no significant or lasting pain noted throughout session.      Progress per Plan of Care and Progress strengthening /stabilization /functional activity, continue as rl.            Timed:         Manual Therapy:     35    mins  77593     Therapeutic Exercise:         mins  51896     Neuromuscular Juancho:        mins  48196    Therapeutic Activity:          mins  00035     Gait Training:           mins  70997     Ultrasound:          mins  86665    Ionto                                   mins  25168  Self Care                            mins  56240    Un-Timed:  Electrical Stimulation:         mins 74445 ( )  Traction          mins 01175    Timed Treatment:   35   mins   Total Treatment:     45   mins    LORE Sierra License #[id number]  Physical Therapist Assistant

## 2022-09-07 ENCOUNTER — TREATMENT (OUTPATIENT)
Dept: PHYSICAL THERAPY | Facility: CLINIC | Age: 57
End: 2022-09-07

## 2022-09-07 DIAGNOSIS — M25.622 JOINT STIFFNESS OF ELBOW, LEFT: Primary | ICD-10-CM

## 2022-09-07 DIAGNOSIS — M25.621 JOINT STIFFNESS OF ELBOW, RIGHT: ICD-10-CM

## 2022-09-07 PROCEDURE — 97110 THERAPEUTIC EXERCISES: CPT | Performed by: PHYSICAL THERAPIST

## 2022-09-07 PROCEDURE — 97140 MANUAL THERAPY 1/> REGIONS: CPT | Performed by: PHYSICAL THERAPIST

## 2022-09-07 NOTE — PROGRESS NOTES
Physical Therapy Daily Treatment Note      Patient: Jhonatan Stewart   : 1965  Referring practitioner: David Rivera MD  Date of Initial Visit: Type: THERAPY  Noted: 2022  Today's Date: 2022  Patient seen for 6 sessions       Visit Diagnoses:    ICD-10-CM ICD-9-CM   1. Joint stiffness of elbow, left  M25.622 719.52   2. Joint stiffness of elbow, right  M25.621 719.52       Jhonatan Stewart reports: I can't tell if my elbows are better or not    Subjective     Objective          Passive Range of Motion     Left Elbow   Extension: 25 degrees     Right Elbow   Extension: 20 degrees       See Exercise, Manual, and Modality Logs for complete treatment.       Assessment & Plan     Assessment    Assessment details: Did show some improvement after manual interventions with elbow extension, but continues to have hard end feel end range.     Plan  Plan details: Continue stretching for elbow and will eval back in future sessions        Manual Therapy:    24     mins  28013;  Therapeutic Exercise:    15     mins  07209;     Neuromuscular Juancho:    -    mins  27203;    Therapeutic Activity:     -     mins  86132;     Gait Training:      --     mins  63721;     Ultrasound:     -     mins  63655;    Electrical Stimulation:    -     mins  32750 ( );  Dry Needling     -     mins self-pay    Timed Treatment:   39   mins   Total Treatment:     40   mins    SHIRA Ramos License #: [id number]    Physical Therapist

## 2022-09-09 ENCOUNTER — TREATMENT (OUTPATIENT)
Dept: PHYSICAL THERAPY | Facility: CLINIC | Age: 57
End: 2022-09-09

## 2022-09-09 DIAGNOSIS — M25.622 JOINT STIFFNESS OF ELBOW, LEFT: Primary | ICD-10-CM

## 2022-09-09 DIAGNOSIS — M25.621 JOINT STIFFNESS OF ELBOW, RIGHT: ICD-10-CM

## 2022-09-09 PROCEDURE — 97140 MANUAL THERAPY 1/> REGIONS: CPT | Performed by: PHYSICAL THERAPIST

## 2022-09-09 PROCEDURE — 97110 THERAPEUTIC EXERCISES: CPT | Performed by: PHYSICAL THERAPIST

## 2022-09-09 NOTE — PROGRESS NOTES
Physical Therapy Daily Treatment Note      Patient: Jhonatan Stewart   : 1965  Referring practitioner: David Rivera MD  Date of Initial Visit: Type: THERAPY  Noted: 2022  Today's Date: 2022  Patient seen for 7 sessions       Visit Diagnoses:    ICD-10-CM ICD-9-CM   1. Joint stiffness of elbow, left  M25.622 719.52   2. Joint stiffness of elbow, right  M25.621 719.52       Jhonatan Stewart reports: I am feeling about the same    Subjective     Objective   See Exercise, Manual, and Modality Logs for complete treatment.       Assessment & Plan     Assessment    Assessment details: Pt continues to have elbow extension limitations, but no pain. Encouraged pt to continue stretching at home.     Plan  Plan details: DC for elbows  Evaluate back next session        Manual Therapy:    20     mins  86211;  Therapeutic Exercise:    15     mins  28516;     Neuromuscular Juancho:    -    mins  22028;    Therapeutic Activity:     -     mins  89044;     Gait Training:      -     mins  84933;     Ultrasound:     -     mins  97607;    Electrical Stimulation:    -     mins  15837 ( );  Dry Needling     -     mins self-pay    Timed Treatment:   35   mins   Total Treatment:     36   mins    Dagmar Luu PT  [place] License #: [id number]    Physical Therapist

## 2022-09-12 ENCOUNTER — TREATMENT (OUTPATIENT)
Dept: PHYSICAL THERAPY | Facility: CLINIC | Age: 57
End: 2022-09-12

## 2022-09-12 ENCOUNTER — OFFICE VISIT (OUTPATIENT)
Dept: ORTHOPEDIC SURGERY | Facility: CLINIC | Age: 57
End: 2022-09-12

## 2022-09-12 VITALS — HEIGHT: 67 IN | WEIGHT: 171.8 LBS | TEMPERATURE: 96.8 F | BODY MASS INDEX: 26.97 KG/M2

## 2022-09-12 DIAGNOSIS — M54.16 RADICULOPATHY, LUMBAR REGION: Primary | ICD-10-CM

## 2022-09-12 DIAGNOSIS — M54.16 LUMBAR RADICULOPATHY: Primary | ICD-10-CM

## 2022-09-12 DIAGNOSIS — S39.012D STRAIN OF LUMBAR REGION, SUBSEQUENT ENCOUNTER: ICD-10-CM

## 2022-09-12 DIAGNOSIS — M16.12 OSTEOARTHRITIS OF LEFT HIP, UNSPECIFIED OSTEOARTHRITIS TYPE: ICD-10-CM

## 2022-09-12 PROCEDURE — 97530 THERAPEUTIC ACTIVITIES: CPT | Performed by: PHYSICAL THERAPIST

## 2022-09-12 PROCEDURE — 97161 PT EVAL LOW COMPLEX 20 MIN: CPT | Performed by: PHYSICAL THERAPIST

## 2022-09-12 PROCEDURE — 97110 THERAPEUTIC EXERCISES: CPT | Performed by: PHYSICAL THERAPIST

## 2022-09-12 PROCEDURE — 99214 OFFICE O/P EST MOD 30 MIN: CPT | Performed by: NURSE PRACTITIONER

## 2022-09-12 RX ORDER — DIAZEPAM 2 MG/1
2 TABLET ORAL 2 TIMES DAILY PRN
Qty: 2 TABLET | Refills: 0 | Status: SHIPPED | OUTPATIENT
Start: 2022-09-12

## 2022-09-12 NOTE — PROGRESS NOTES
Physical Therapy Initial Evaluation and Plan of Care    Patient: Jhonatan Stewart   : 1965  Diagnosis/ICD-10 Code:  Radiculopathy, lumbar region [M54.16]  Referring practitioner: Jordin Hawk MD  Date of Initial Visit: 2022  Today's Date: 2022  Patient seen for 1 session         Visit Diagnoses:    ICD-10-CM ICD-9-CM   1. Radiculopathy, lumbar region  M54.16 724.4   2. Osteoarthritis of left hip, unspecified osteoarthritis type  M16.12 715.95   3. Strain of lumbar region, subsequent encounter  S39.012D V58.89     847.2       PMHx Reviewed : 2022      Subjective Evaluation    History of Present Illness  Mechanism of injury: Pt presents to PT with a hx of (L) LE pain that was present in the (L) lateral hip, (L) lateral knee and (L) lateral ankle.  Pt had seen Dr. Hawk for his (L) hip but Dr. Hawk said it was his lumbar and not his hip; referring him to PT.  The pain has been present since       In  had (L) LE numbness.  He had epidural in the lumbar but no real difference seen.  Went to PT with good success of making symptoms go away.    He worked out on Friday, Saturday with wts and body wt exercises on  morning and by the afternoon had pain in the (L) hip and (L) ankle.  Last time had I had pain was in 2021.        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)  Progression: no change    Social Support  Lives in: multiple-level home  Lives with: spouse (Dogs 40 lb lab (have to lift))              Objective          Active Range of Motion     Lumbar   Flexion: 100 (%) degrees   Extension: 50 (%) degrees   Left lateral flexion: 75 (%) degrees   Right lateral flexion: 75 (%) degrees   Left rotation: 100 (%) degrees   Right rotation: 50 (%) degrees     Strength/Myotome Testing     Left Hip   Planes of Motion   Flexion: 4  Abduction: 4+  External rotation: 5  Internal rotation: 4    Right Hip   Planes of Motion   Flexion: 5  Abduction: 5  External rotation: 5  Internal rotation: 5    Left Knee   Flexion: 5  Extension: 5    Right Knee   Flexion: 5  Extension: 5    Left Ankle/Foot   Dorsiflexion: 5  Eversion: 5  Great toe extension: 5    Right Ankle/Foot   Dorsiflexion: 5  Eversion: 5  Great toe extension: 5          Assessment & Plan     Assessment  Impairments: abnormal or restricted ROM, activity intolerance, impaired balance, impaired physical strength, lacks appropriate home exercise program, pain with function, safety issue and weight-bearing intolerance  Functional Limitations: carrying objects, lifting, sleeping, walking, pulling, pushing, uncomfortable because of pain, moving in bed, sitting, stooping and unable to perform repetitive tasks  Assessment details: Pt presents to PT with symptoms consistent with lumbar strain and (L) LE radiculopathy.  Pt would benefit from skilled PT intervention to address the deficits noted.   Prognosis: good    Goals  Plan Goals: SHORT TERM GOALS: Time for Goal Achievement: 4 weeks  1.  Patient to be compliant and progression of HEP                             2.  Pain level < 5/10 at worst with mentioned activities to improve function  3.  Increased thoracic, lumbar and SIJ mobility to allow for increased lumbar AROM with less pain.  4.  Increased lumbar AROM to by 25% in all planes to allow for increased ease with sit-stand transfers and functional activities to assist with dressing.      LONG TERM GOALS: Time for Goal Achievement: 8 weeks  1.  Pt. to score < 8% on  Back Index  2.  Pain level < 3/10 with all listed activities to return to normal.  3.  Lumbar AROM to WFL to allow for return to household & recreational activities w/o increased symptoms   4.  (B) LE and lower abdominal strength to 5/5 to allow for pushing, pulling and activities to occur without pain (driving, sitting, exercise, household requirements)        Plan  Therapy options: will be seen for skilled therapy services  Planned modality interventions: cryotherapy, electrical stimulation/Russian stimulation, TENS, thermotherapy (hydrocollator packs), ultrasound and dry needling  Other planned modality interventions: Cupping  Planned therapy interventions: body mechanics training, flexibility, functional ROM exercises, home exercise program, manual therapy, neuromuscular re-education, postural training, spinal/joint mobilization, stretching, strengthening, soft tissue mobilization, therapeutic activities, motor coordination training and joint mobilization  Frequency: 2x week  Duration in weeks: 8  Treatment plan discussed with: patient        Manual Therapy:          mins  85460;  Therapeutic Exercise:     15     mins  71405;     Neuromuscular Juancho:         mins  39871;    Therapeutic Activity:      10     mins  89596;     Gait Training:            mins  08866;     Ultrasound:           mins  09277;    Electrical Stimulation:          mins  08189 ( );  Dry Needling           mins self-pay  Traction           mins 79415  Canalith Repositioning         mins 80549      Timed Treatment:   25   mins   Total Treatment:     65   mins      PT: Jose Luis Birch PT     License Number: [id number]  Electronically signed by Jose Luis Birch PT, 09/12/22, 8:06 AM EDT    Certification Period: 9/12/2022 thru 12/10/2022  I certify that the therapy services are furnished while this patient is under my care.  The services outlined above are required by this patient, and will be reviewed every 90 days.         Physician  Signature:__________________________________________________    PHYSICIAN: Jordin Hawk MD  NPI: [id number]                                      DATE:      Please sign in Epic or return via fax to [email or web address] . Thank you, [facility].

## 2022-09-12 NOTE — PROGRESS NOTES
Patient Name: Jhonatan Stewart   YOB: 1965  Referring Primary Care Physician: Ronal Gutiérrez MD      Chief Complaint:    Chief Complaint   Patient presents with   • Lumbar Spine - Initial Evaluation        HPI:  Jhonatan Stewart is a 56 y.o. male who presents to [facility] for evaluation of back pain referring to the left lower extremity. This is an established patient in the office, new to me.  He describes pain primarily left lateral hip extending down to the ankle.  He initially saw Dr Hawk thinking this was a hip issue, Dr Hawk felt this was most likely radicular in nature after obtaining hip and lumbar films.  He was referred to physical therapy and had his first appointment this morning.  He does have history of similar complaints in the past.  With last episode, he said the leg symptoms are primarily numbness and tingling.  He tried an epidural injection which did not offer relief and physical therapy which did not offer relief.  [place]  He had an MRI and he recalls being told he had something going on at L3, L4 and L5.    PFSH:  See attached    ROS: As per HPI, otherwise negative    Objective:    Vitals:    09/12/22 1257   Temp: 96.8 °F (36 °C)     Body mass index is 26.9 kg/m².      Physical Exam  Vitals reviewed.   Constitutional:       Appearance: He is well-developed and well-nourished.   Eyes:      General: No scleral icterus.  Skin:     General: Skin is intact.   Neurological:      Mental Status: He is alert.      Gait: Gait is intact.   Psychiatric:         Mood and Affect: Mood and affect normal.       Spine Musculoskeletal Exam    General        Constitutional: well-developed and well-nourished    Scleral icterus: no    Labored breathing: no    Psychiatric: normal mood and affect and no acute distress    Neurological: alert and oriented x3    Skin: intact    Gait      Gait is  normal.    Inspection      Thigh atrophy comment: Possibly slight right thigh atrophy compared to left    Palpation      Thoracolumbar      Tenderness: none    Strength      Thoracolumbar          Right Thoracolumbar        Extensor hallucis longus: 5/5      Tibialis anterior: 5/5      Tibialis posterior: 5/5      Quadriceps: 4+/5      Hamstrin/5      Hip abductors: 5/5        Left Thoracolumbar        Extensor hallucis longus: 5/5      Tibialis anterior: 5/5      Tibialis posterior: 5/5      Quadriceps: 5/5      Hamstrin/5      Hip abductors: 5/5    Sensory       Thoracolumbar      Thoracolumbar sensation is normal.    Reflexes        Right Spine        Quadriceps: 2/4      Achilles: 2/4       Left Spine        Quadriceps: 2/4      Achilles: 2/4    Special Tests      Thoracolumbar          Right Thoracolumbar        AFSANEH test: negative      SLR: no back or leg pain        Left Thoracolumbar        AFSANEH test: negative      SLR: no back or leg pain        IMAGING:     No films in the office today, he did have x-ray of the lumbar spine AP and lateral which was reviewed on 2022.  Reveals disc space narrowing L5-S1, no spondylolisthesis, no fractures.    Assessment:           Diagnoses and all orders for this visit:    1. Lumbar radiculopathy (Primary)  -     MRI Lumbar Spine Without Contrast; Future  -     diazePAM (Valium) 2 MG tablet; Take 1 tablet by mouth 2 (Two) Times a Day As Needed for Anxiety (30 minutes prior to MRI, may repeat once if needed. No driving while taking this medication.).  Dispense: 2 tablet; Refill: 0             Plan:  Given the fact he has some weakness with right quadricep and possibly some atrophy of the right quadricep when compared to the left, although symptoms are in the left leg only, I do believe lumbar MRI is justified.  He is encouraged to finish out the physical therapy and keep up with those exercises long-term.  We will call him with the results of the lumbar MRI  and depending on therapy results will direct treatment with either epidural injections, continuation of HEP.  He wants to avoid surgery at all cost, do not feel the symptoms currently will likely warrant surgery unless there is something on MRI that explains the right leg weakness and atrophy.  He does report some claustrophobia so we will prescribe Valium 2 mg to be taken before MRI and repeat x1 if needed.  He was instructed not to drive while taking Valium.    Return if symptoms worsen or fail to improve, call with MRI.    The diagnosis(es), natural history, pathophysiology and treatment for diagnosis(es) were discussed. Opportunity given and questions answered. Biomechanics of pertinent body areas discussed.    EXERCISES:  Advice on benefits of, and types of regular/moderate exercise pertaining to diagnosis.  Continue HEP upon completion of PT.  MEDICATIONS:  The risks, benefits, warnings,side effects and alternatives of medications discussed.  PAIN CONTROL:  Cold, heat, elevation and/or OTC lidocaine ointments.  MEDICAL RECORDS reviewed from other provider(s) for past and current medical history pertinent to this visit..

## 2022-09-14 ENCOUNTER — TELEPHONE (OUTPATIENT)
Dept: ORTHOPEDIC SURGERY | Facility: CLINIC | Age: 57
End: 2022-09-14

## 2022-09-14 NOTE — TELEPHONE ENCOUNTER
When Georgina called to schedule his MRI he asked about valium. It was called on the day of his appointment

## 2022-09-15 ENCOUNTER — TREATMENT (OUTPATIENT)
Dept: PHYSICAL THERAPY | Facility: CLINIC | Age: 57
End: 2022-09-15

## 2022-09-15 DIAGNOSIS — M25.622 JOINT STIFFNESS OF ELBOW, LEFT: Primary | ICD-10-CM

## 2022-09-15 DIAGNOSIS — M25.621 JOINT STIFFNESS OF ELBOW, RIGHT: ICD-10-CM

## 2022-09-15 PROCEDURE — 97140 MANUAL THERAPY 1/> REGIONS: CPT | Performed by: PHYSICAL THERAPIST

## 2022-09-15 NOTE — TELEPHONE ENCOUNTER
I called lilly and left a voicemail and also sent a mychart message letting him know the medication was sent to the pharmacy

## 2022-09-15 NOTE — PROGRESS NOTES
Physical Therapy Daily Treatment Note  Visit # 8        Patient: Jhonatan Stewart   : 1965  Referring practitioner: David Rivera MD  Date of Initial Evaluation:  Type: THERAPY  Noted: 2022  Today's Date: 9/15/2022           ICD-10-CM ICD-9-CM   1. Joint stiffness of elbow, left  M25.622 719.52   2. Joint stiffness of elbow, right  M25.621 719.52       Subjective  Jhonatan Stewart reports:   I think this will be the last session for my elbows.  I have an MRI scheduled this weekend for my back.      Objective   See Exercise, Manual, and Modality Logs for complete treatment.   Reviewed current HEP for elbow range and stretching.     Active Range of Motion      Left Elbow   Flexion: 130 degrees  (130 degrees)  Extension: 36 degrees  (22 degrees)      Right Elbow   Flexion: 130 degrees  (130 degrees)  Extension: 30 degrees  (20 degrees)       Assessment & Plan     Assessment    Assessment details: Presents with improved active elbow extension today vs status at eval.  Pt well versed in independent program, will continue with body weight exercises for elbow extension stretching and general ROM.     Plan  Plan details: DC UE Rx, will resume Rx to low back next visit          Timed:         Manual Therapy:     30    mins  14854     Therapeutic Exercise:         mins  81419     Neuromuscular Juancho:        mins  95846    Therapeutic Activity:          mins  55556     Gait Training:           mins  49032     Ultrasound:          mins  55079    Ionto                                   mins  63827  Self Care                            mins  58395    Un-Timed:  Electrical Stimulation:         mins 21097 ( )  Traction          mins 78924    Timed Treatment:   30   mins   Total Treatment:     38   mins    LORE Sierra License #[id number]  Physical Therapist Assistant

## 2022-09-30 ENCOUNTER — TREATMENT (OUTPATIENT)
Dept: PHYSICAL THERAPY | Facility: CLINIC | Age: 57
End: 2022-09-30

## 2022-09-30 DIAGNOSIS — M25.622 JOINT STIFFNESS OF ELBOW, LEFT: Primary | ICD-10-CM

## 2022-09-30 DIAGNOSIS — M54.16 RADICULOPATHY, LUMBAR REGION: ICD-10-CM

## 2022-09-30 DIAGNOSIS — M25.621 JOINT STIFFNESS OF ELBOW, RIGHT: ICD-10-CM

## 2022-09-30 PROCEDURE — 97110 THERAPEUTIC EXERCISES: CPT | Performed by: PHYSICAL THERAPIST
